# Patient Record
Sex: FEMALE | Race: WHITE | NOT HISPANIC OR LATINO | Employment: STUDENT | URBAN - METROPOLITAN AREA
[De-identification: names, ages, dates, MRNs, and addresses within clinical notes are randomized per-mention and may not be internally consistent; named-entity substitution may affect disease eponyms.]

---
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
## 2023-06-29 NOTE — PROGRESS NOTES
Assessment:  1. Lumbar disc herniation with radiculopathy        Plan:  Orders Placed This Encounter   Procedures   • FL spine and pain procedure     Standing Status:   Future     Standing Expiration Date:   7/3/2027     Order Specific Question:   Reason for Exam:     Answer:   RIGHT L4 and L5 TFESI depo     Order Specific Question:   Anticoagulant hold needed? Answer:   no     Order Specific Question:   Is the patient pregnant? Answer:   Unknown   • Ambulatory referral to Physical Therapy     Standing Status:   Future     Standing Expiration Date:   7/3/2024     Referral Priority:   Routine     Referral Type:   Physical Therapy     Referral Reason:   Specialty Services Required     Requested Specialty:   Physical Therapy     Number of Visits Requested:   1     Expiration Date:   7/3/2024       New Medications Ordered This Visit   Medications   • albuterol (PROVENTIL HFA,VENTOLIN HFA) 90 mcg/act inhaler     Sig: inhale 2 puffs by mouth every 6 hours if needed for wheezing   • EQL CETIRIZINE HCL CHILD ALRGY PO     Sig: Take by mouth   • fluticasone (FLONASE) 50 mcg/act nasal spray     Si sprays by Each Nare route daily   • gabapentin (NEURONTIN) 300 mg capsule     Sig: Take 1 capsule by mouth 3 (three) times a day   • meloxicam (MOBIC) 15 mg tablet     Sig: Take 1 tablet every day by oral route for 30 days. My impressions and treatment recommendations were discussed in detail with the patient, who verbalized understanding and had no further questions. This is a 21year old female presents her office chief complaint of primarily right-sided low back pain with radiation down the right lower extremity. She appears to describe pain in mostly L5 dermatomal distribution with notable pain in the right posterior lateral calf. She has not yet undergone any course of physical therapy. I will order this for her.   We will also schedule her for right L4 and L5 transforaminal epidurals injection fluoroscopic guidance and with her symptoms. Discussed that she may need more than 1 injection to get the maximum level of improvement. She will be returning to moderately orthopedics following injection treatments. She is currently on gabapentin 3 mg 3 times daily and meloxicam 15 mg daily as needed. Would recommend that she continue these medications until we see how she does with the procedure. [place] Prescription Drug Monitoring Program report was reviewed and was appropriate     Complete risks and benefits including bleeding, infection, tissue reaction, nerve injury and allergic reaction were discussed. The approach was demonstrated using models and literature was provided. Verbal and written consent was obtained. Discharge instructions were provided. I personally saw and examined the patient and I agree with the above discussed plan of care. History of Present Illness:    Bridgette Rodriguez is a 21 y.o. female who presents to [facility] for initial evaluation of the above stated pain complaints. The patient has a past medical and chronic pain history as outlined in the assessment section. She was referred by Maik Kilgore MD  [address] [facility] [address] [facility] [address] [facility] [address] [place],  [facility] [address] [facility] . Patient is here with chief complaint of back and right leg pain for 11 months. She is a student. Pain began of an undetermined cause. Moderate to severe in intensity over the past month. Pain score 7 out of 10. Constant. No typical pattern. Described as burning, pins-and-needles, sharp in sensation. There is subjective weakness of lower extremities. Denies bowel bladder incontinence or saddle anesthesia. It is increased with standing, bending, sitting, walking, exercise. Decreased with lying down and relaxation. No change with coughing, sneezing. She did have MRI of the lumbar spine and we have the report to review.   There is mention of right paracentral disc protrusion at the L4-5 level with possible impingement of the right L5 nerve. Hospitalist includes asthma. Reports no relief with heat/ice therapy. No recent physical therapy. No tobacco or marijuana use. No alcohol use. Not on blood thinners. Not allergic to latex or contrast dye. She is taking gabapentin which she feels is not providing much benefit. Review of Systems:    Review of Systems   Constitutional: Negative for fever and unexpected weight change. HENT: Negative for trouble swallowing. Eyes: Negative for visual disturbance. Respiratory: Negative for shortness of breath and wheezing. Cardiovascular: Negative for chest pain and palpitations. Gastrointestinal: Negative for constipation, diarrhea, nausea and vomiting. Endocrine: Negative for cold intolerance, heat intolerance and polydipsia. Genitourinary: Negative for difficulty urinating and frequency. Musculoskeletal: Positive for back pain and gait problem. Negative for arthralgias, joint swelling and myalgias. Skin: Negative for rash. Neurological: Negative for dizziness, seizures, syncope, weakness and headaches. Hematological: Does not bruise/bleed easily. Psychiatric/Behavioral: Negative for dysphoric mood. All other systems reviewed and are negative. History reviewed. No pertinent past medical history. History reviewed. No pertinent surgical history. History reviewed. No pertinent family history. Social History     Occupational History   • Not on file   Tobacco Use   • Smoking status: Never   • Smokeless tobacco: Never   Substance and Sexual Activity   • Alcohol use: Never   • Drug use: Never   • Sexual activity: Never         Current Outpatient Medications:   •  fluticasone (FLONASE) 50 mcg/act nasal spray, 2 sprays by Each Nare route daily, Disp: , Rfl:   •  meloxicam (MOBIC) 15 mg tablet, Take 1 tablet every day by oral route for 30 days. , Disp: , Rfl:   •  albuterol (Doreen Bloch HFA) 90 mcg/act inhaler, inhale 2 puffs by mouth every 6 hours if needed for wheezing, Disp: , Rfl:   •  EQL CETIRIZINE HCL CHILD ALRGY PO, Take by mouth, Disp: , Rfl:   •  gabapentin (NEURONTIN) 300 mg capsule, Take 1 capsule by mouth 3 (three) times a day, Disp: , Rfl:     Allergies   Allergen Reactions   • Amoxicillin Hives   • Dog Epithelium Allergy Skin Test Rash   • Dust Mite Extract Rash   • Shellfish-Derived Products - Food Allergy Rash       Physical Exam:    /79 (BP Location: Left arm, Patient Position: Sitting, Cuff Size: Standard)   Pulse 60   Wt 73 kg (161 lb)   BMI 28.52 kg/m²     Constitutional: normal, well developed, well nourished, alert, in no distress and non-toxic and no overt pain behavior.   Eyes: anicteric  HEENT: grossly intact  Neck: supple, symmetric, trachea midline and no masses   Pulmonary:even and unlabored  Cardiovascular:No edema or pitting edema present  Skin:Normal without rashes or lesions and well hydrated  Psychiatric:Mood and affect appropriate  Neurologic:Cranial Nerves II-XII grossly intact  Musculoskeletal:normal     Lumbar Spine Exam    Appearance:  Normal lordosis  Sensory:  no sensory deficits noted except: decreased sensation right touch R L4, L5, S1  Range of Motion:  Full range of motion with no pain or limitations in flexion, extension, lateral flexion and rotation  Motor Strength:  Left hip flexion:  5/5  Left hip extension:  5/5  Right hip flexion:  5/5  Right hip extension:  5/5  Left knee flexion:  5/5  Left knee extension:  5/5  Right knee flexion:  5/5  Right knee extension:  5/5  Left foot dorsiflexion:  5/5  Left foot plantar flexion:  5/5  Right foot dorsiflexion:  5/5  Right foot plantar flexion:  5/5  Reflexes:  Left Patellar:  2+   Right Patellar:  2+   Left Achilles:  2+   Right Achilles:  2+   Special Tests:  Left Straight Leg Test:  negative  Right Straight Leg Test:  negative      Imaging    MRI lumbar spine wo contrast 6/9/23  Order: [id number]  Impression    Impression: Moderate degenerative disc disease at L4-L5 including a   central/right paracentral disc protrusion which indents the traversing right L5   nerve roots. Mild central zone stenosis and mild right foraminal stenosis at   L4-L5. Workstation:[id number]  Narrative    History: Low back pain, unspecified back pain laterality, unspecified   chronicity, unspecified whether sciatica present     Procedure: MRI of the lumbar spine was obtained with the following sequences:   Sagittal T1, sagittal T2, sagittal STIR and axial T2 weighted images. No   intravenous contrast.     Comparison: None available     Findings: For the purposes of this dictation, the lumbar vertebrae are labeled   from a caudal to cranial direction, the first vertebra with lumbar morphology is   labeled as L5. Conus and lower thoracic cord: The conus terminates at the L1 level and is   unremarkable. The distal thoracic cord is normal in caliber and signal. There is   a normal distribution of the cauda equina nerve roots in the thecal sac. Marrow and Alignment: There is normal lumbar lordosis. Scattered small Schmorl's   nodes. The vertebral bodies are otherwise normal in height and alignment. No   focal suspicious marrow signal abnormality. Disc desiccation and moderate disc space narrowing at L4-L5. L1-L2: No disc herniation. No spinal canal or foraminal stenosis. L2-L3: No disc herniation. No spinal canal or foraminal stenosis. L3-L4: No disc herniation. No spinal canal or foraminal stenosis. L4-L5: Mild disc bulge with a superimposed broad central/right paracentral disc   protrusion which measures 5 mm in the AP dimension and indents the traversing   right L5 nerve roots (series 2, image 9; series 5, image 18). Mild central zone   stenosis. Mild right foraminal stenosis. No left foraminal stenosis. L5-S1: No disc herniation. No spinal canal or foraminal stenosis.      The posterior ligamentous structures and paraspinal musculature are normal in   signal.    FL spine and pain procedure    (Results Pending)       Orders Placed This Encounter   Procedures   • FL spine and pain procedure   • Ambulatory referral to Physical Therapy

## 2023-07-03 ENCOUNTER — CONSULT (OUTPATIENT)
Dept: PAIN MEDICINE | Facility: CLINIC | Age: 18
End: 2023-07-03
Payer: COMMERCIAL

## 2023-07-03 VITALS
HEART RATE: 60 BPM | BODY MASS INDEX: 28.52 KG/M2 | WEIGHT: 161 LBS | DIASTOLIC BLOOD PRESSURE: 79 MMHG | SYSTOLIC BLOOD PRESSURE: 112 MMHG

## 2023-07-03 DIAGNOSIS — M51.16 LUMBAR DISC HERNIATION WITH RADICULOPATHY: Primary | ICD-10-CM

## 2023-07-03 PROCEDURE — 99204 OFFICE O/P NEW MOD 45 MIN: CPT | Performed by: STUDENT IN AN ORGANIZED HEALTH CARE EDUCATION/TRAINING PROGRAM

## 2023-07-03 RX ORDER — ALBUTEROL SULFATE 90 UG/1
AEROSOL, METERED RESPIRATORY (INHALATION)
COMMUNITY
Start: 2023-06-08

## 2023-07-03 RX ORDER — GABAPENTIN 300 MG/1
1 CAPSULE ORAL 3 TIMES DAILY
COMMUNITY

## 2023-07-03 RX ORDER — MELOXICAM 15 MG/1
TABLET ORAL
COMMUNITY
Start: 2023-06-26

## 2023-07-03 RX ORDER — FLUTICASONE PROPIONATE 50 MCG
2 SPRAY, SUSPENSION (ML) NASAL DAILY
COMMUNITY
Start: 2023-02-17

## 2023-07-03 NOTE — PATIENT INSTRUCTIONS
Epidural Steroid Injection   AMBULATORY CARE:   What you need to know about an epidural steroid injection (MESFIN):  An MESFIN is a procedure to inject steroid medicine into the epidural space. The epidural space is between your spinal cord and vertebrae. Steroids reduce inflammation and fluid buildup in your spine that may be causing pain. You may be given pain medicine along with the steroids. How to prepare for an MESFIN:  Your provider will talk to you about how to prepare for your procedure. He or she will tell you what medicines to take or not take on the day of your procedure. You may need to stop taking blood thinners or other medicines several days before your procedure. You may need to adjust any diabetes medicine you take on the day of your procedure. Steroid medicine can increase your blood sugar level. Arrange for someone to drive you home when you are discharged. What will happen during an MESFIN:   You will be given medicine to numb the procedure area. You will be awake for the procedure, but you will not feel pain. You may also be given medicine to help you relax. Contrast liquid will be used to help your provider see the area better. Tell the provider if you have ever had an allergic reaction to contrast liquid. Your provider may place the needle into your neck area, middle of your back, or tailbone area. He or she may inject the medicine next to the nerves that are causing your pain. He or she may instead inject the medicine into a larger area of the epidural space. This helps the medicine spread to more nerves. Your provider will use a fluoroscope to help guide the needle to the right place. A fluoroscope is a type of x-ray. After the procedure, a bandage will be placed over the injection site to prevent infection. What will happen after an MESFIN:  You will have a bandage over the injection site to prevent infection. Your provider will tell you when you can bathe and any activity guidelines.  You will be able to go home. Risks of an MESFIN:  You may have temporary or permanent nerve damage or paralysis. You may have bleeding or develop a serious infection, such as meningitis (swelling of the brain coverings). An abscess may also develop. An abscess is a pus-filled area under the skin. You may need surgery to fix the abscess. You may have a seizure, anxiety, or trouble sleeping. If you are a man, you may have temporary erectile dysfunction (not able to have an erection). Call your local emergency number (911 in the [address]) if:   You have a seizure. You have trouble moving your legs. Seek care immediately if:   Blood soaks through your bandage. You have a fever or chills, severe back pain, and the procedure area is sensitive to the touch. You cannot control when you urinate or have a bowel movement. Call your doctor if:   You have weakness or numbness in your legs. Your wound is red, swollen, or draining pus. You have nausea or are vomiting. Your face or neck is red and you feel warm. You have more pain than you had before the procedure. You have swelling in your hands or feet. You have questions or concerns about your condition or care. Care for your wound as directed: You may remove the bandage before you go to bed the day of your procedure. You may take a shower, but do not take a bath for at least 24 hours. Self-care:   Do not drive,  use machines, or do strenuous activity for 24 hours after your procedure or as directed. Continue other treatments  as directed. Steroid injections alone will not control your pain. The injections are meant to be used with other treatments, such as physical therapy. Follow up with your doctor as directed:  Write down your questions so you remember to ask them during your visits. © Copyright [employer] 2022 Information is for End User's use only and may not be sold, redistributed or otherwise used for commercial purposes.   The above information is an  only. It is not intended as medical advice for individual conditions or treatments. Talk to your doctor, nurse or pharmacist before following any medical regimen to see if it is safe and effective for you.

## 2023-07-25 ENCOUNTER — TELEPHONE (OUTPATIENT)
Dept: PAIN MEDICINE | Facility: CLINIC | Age: 18
End: 2023-07-25

## 2023-07-25 ENCOUNTER — TELEPHONE (OUTPATIENT)
Dept: RADIOLOGY | Facility: CLINIC | Age: 18
End: 2023-07-25

## 2023-07-25 NOTE — TELEPHONE ENCOUNTER
Left voicemail asking the patient to call back regarding procedure scheduled on 7.26.23 with Dr. Roxanne Abel. I let her know insurance is still pending and we will need to reschedule her procedure.

## 2023-07-25 NOTE — TELEPHONE ENCOUNTER
Caller: Darrick Perkins     Doctor: Rosaura Ryan     Reason for call: he will send info regarding authorization via fax reference # [id number]     Call back#: [phone] ext [phone]

## 2023-07-25 NOTE — TELEPHONE ENCOUNTER
Patient called back . She advised she is in severe pain and upset authorization was not received before now. She has been rescheduled for 8.15.23 @ 9 am . Message was sent to auth team letting them know patient is requesting a call to let her know when Armando Donnelly has been approved.

## 2023-07-25 NOTE — TELEPHONE ENCOUNTER
Caller: Kathy Kohler     Doctor: Dr Rashmi Kapoor for call: Patient mother calling back stating per insurance authorization not being submitted correctly please call [phone]    Call back#: [phone]

## 2023-07-26 NOTE — TELEPHONE ENCOUNTER
Caller: Dr. Jyothi Jo office    Doctor: Bg Pang    Reason for call: Please call number below for [facility] for injection    Call back#: [phone]

## 2023-07-26 NOTE — TELEPHONE ENCOUNTER
Will call for P2P.  Also cc'ing Jajohn Sailors to bring attention to the situation as patient is dissatisfied

## 2023-07-26 NOTE — TELEPHONE ENCOUNTER
Caller: Roxanne Wells    Doctor: Dr Marybeth Álvarez    Reason for call: [facility] is [phone]    Call back#: [phone]

## 2023-07-27 ENCOUNTER — TELEPHONE (OUTPATIENT)
Dept: PAIN MEDICINE | Facility: CLINIC | Age: 18
End: 2023-07-27

## 2023-07-27 NOTE — TELEPHONE ENCOUNTER
Caller: peer to peer    Doctor: brenton    Reason for call: Please call Dr. Josefina Jones [phone] to do a peer to peer for pt.     Call back#: [phone]

## 2023-07-28 NOTE — TELEPHONE ENCOUNTER
Caller: patient daughter    Doctor: Chiquita Vieira    Reason for call: would like to know what needs to be done for the patient to get her procedure done.  Would like a call back    Call back#: [phone]

## 2023-08-02 ENCOUNTER — TELEPHONE (OUTPATIENT)
Dept: OBGYN CLINIC | Facility: HOSPITAL | Age: 18
End: 2023-08-02

## 2023-08-02 ENCOUNTER — TELEPHONE (OUTPATIENT)
Age: 18
End: 2023-08-02

## 2023-08-02 DIAGNOSIS — M51.16 LUMBAR DISC HERNIATION WITH RADICULOPATHY: Primary | ICD-10-CM

## 2023-08-02 NOTE — TELEPHONE ENCOUNTER
Called patient and LVM making them aware I was canceling procedure since it has been denied also let them know she needs to attend PT told them to give office a call if they have any questions

## 2024-02-14 NOTE — TELEPHONE ENCOUNTER
Any further update on this yesterday?
Called patient and left voicemail explaining procedure was denied due to not having completed six weeks of PT. I let the patient know that a PT order has been placed by Dr. Marybeth Álvarez.
Caller: pt    Doctor: brenton    Reason for call: pt has been trying to get a hold of office to talk about the procedure she was supposed to get. They are upset and would like to get the procedure approved.     Call back#: [phone]
I sent a message back to the auth team on Monday. Injection declined.  Patient requires recent PT.
Please advise pt
Resident